# Patient Record
Sex: FEMALE | Race: OTHER | HISPANIC OR LATINO | URBAN - METROPOLITAN AREA
[De-identification: names, ages, dates, MRNs, and addresses within clinical notes are randomized per-mention and may not be internally consistent; named-entity substitution may affect disease eponyms.]

---

## 2020-04-08 ENCOUNTER — EMERGENCY (EMERGENCY)
Facility: HOSPITAL | Age: 55
LOS: 1 days | Discharge: HOME | End: 2020-04-08
Attending: STUDENT IN AN ORGANIZED HEALTH CARE EDUCATION/TRAINING PROGRAM | Admitting: STUDENT IN AN ORGANIZED HEALTH CARE EDUCATION/TRAINING PROGRAM
Payer: COMMERCIAL

## 2020-04-08 VITALS
SYSTOLIC BLOOD PRESSURE: 150 MMHG | DIASTOLIC BLOOD PRESSURE: 100 MMHG | TEMPERATURE: 99 F | RESPIRATION RATE: 14 BRPM | WEIGHT: 160.06 LBS | HEART RATE: 88 BPM | OXYGEN SATURATION: 98 %

## 2020-04-08 LAB
ALBUMIN SERPL ELPH-MCNC: 3.4 G/DL — SIGNIFICANT CHANGE UP (ref 3.3–5)
ALP SERPL-CCNC: 91 U/L — SIGNIFICANT CHANGE UP (ref 40–120)
ALT FLD-CCNC: 55 U/L — SIGNIFICANT CHANGE UP (ref 12–78)
ANION GAP SERPL CALC-SCNC: 6 MMOL/L — SIGNIFICANT CHANGE UP (ref 5–17)
APPEARANCE UR: CLEAR — SIGNIFICANT CHANGE UP
AST SERPL-CCNC: 43 U/L — HIGH (ref 15–37)
BASOPHILS # BLD AUTO: 0.01 K/UL — SIGNIFICANT CHANGE UP (ref 0–0.2)
BASOPHILS NFR BLD AUTO: 0.2 % — SIGNIFICANT CHANGE UP (ref 0–2)
BILIRUB SERPL-MCNC: 0.2 MG/DL — SIGNIFICANT CHANGE UP (ref 0.2–1.2)
BILIRUB UR-MCNC: NEGATIVE — SIGNIFICANT CHANGE UP
BUN SERPL-MCNC: 6 MG/DL — LOW (ref 7–23)
CALCIUM SERPL-MCNC: 9.2 MG/DL — SIGNIFICANT CHANGE UP (ref 8.5–10.1)
CHLORIDE SERPL-SCNC: 107 MMOL/L — SIGNIFICANT CHANGE UP (ref 96–108)
CO2 SERPL-SCNC: 26 MMOL/L — SIGNIFICANT CHANGE UP (ref 22–31)
COLOR SPEC: YELLOW — SIGNIFICANT CHANGE UP
CREAT SERPL-MCNC: 0.65 MG/DL — SIGNIFICANT CHANGE UP (ref 0.5–1.3)
DIFF PNL FLD: ABNORMAL
EOSINOPHIL # BLD AUTO: 0.01 K/UL — SIGNIFICANT CHANGE UP (ref 0–0.5)
EOSINOPHIL NFR BLD AUTO: 0.2 % — SIGNIFICANT CHANGE UP (ref 0–6)
GLUCOSE SERPL-MCNC: 99 MG/DL — SIGNIFICANT CHANGE UP (ref 70–99)
GLUCOSE UR QL: NEGATIVE — SIGNIFICANT CHANGE UP
HCG UR QL: NEGATIVE — SIGNIFICANT CHANGE UP
HCT VFR BLD CALC: 41.8 % — SIGNIFICANT CHANGE UP (ref 34.5–45)
HGB BLD-MCNC: 14.2 G/DL — SIGNIFICANT CHANGE UP (ref 11.5–15.5)
IMM GRANULOCYTES NFR BLD AUTO: 0.2 % — SIGNIFICANT CHANGE UP (ref 0–1.5)
KETONES UR-MCNC: NEGATIVE — SIGNIFICANT CHANGE UP
LEUKOCYTE ESTERASE UR-ACNC: NEGATIVE — SIGNIFICANT CHANGE UP
LYMPHOCYTES # BLD AUTO: 0.8 K/UL — LOW (ref 1–3.3)
LYMPHOCYTES # BLD AUTO: 15.3 % — SIGNIFICANT CHANGE UP (ref 13–44)
MCHC RBC-ENTMCNC: 29.9 PG — SIGNIFICANT CHANGE UP (ref 27–34)
MCHC RBC-ENTMCNC: 34 GM/DL — SIGNIFICANT CHANGE UP (ref 32–36)
MCV RBC AUTO: 88 FL — SIGNIFICANT CHANGE UP (ref 80–100)
MONOCYTES # BLD AUTO: 0.36 K/UL — SIGNIFICANT CHANGE UP (ref 0–0.9)
MONOCYTES NFR BLD AUTO: 6.9 % — SIGNIFICANT CHANGE UP (ref 2–14)
NEUTROPHILS # BLD AUTO: 4.04 K/UL — SIGNIFICANT CHANGE UP (ref 1.8–7.4)
NEUTROPHILS NFR BLD AUTO: 77.2 % — HIGH (ref 43–77)
NITRITE UR-MCNC: NEGATIVE — SIGNIFICANT CHANGE UP
NRBC # BLD: 0 /100 WBCS — SIGNIFICANT CHANGE UP (ref 0–0)
PH UR: 8 — SIGNIFICANT CHANGE UP (ref 5–8)
PLATELET # BLD AUTO: 174 K/UL — SIGNIFICANT CHANGE UP (ref 150–400)
POTASSIUM SERPL-MCNC: 4 MMOL/L — SIGNIFICANT CHANGE UP (ref 3.5–5.3)
POTASSIUM SERPL-SCNC: 4 MMOL/L — SIGNIFICANT CHANGE UP (ref 3.5–5.3)
PROT SERPL-MCNC: 7.8 G/DL — SIGNIFICANT CHANGE UP (ref 6–8.3)
PROT UR-MCNC: NEGATIVE — SIGNIFICANT CHANGE UP
RBC # BLD: 4.75 M/UL — SIGNIFICANT CHANGE UP (ref 3.8–5.2)
RBC # FLD: 12.3 % — SIGNIFICANT CHANGE UP (ref 10.3–14.5)
SODIUM SERPL-SCNC: 139 MMOL/L — SIGNIFICANT CHANGE UP (ref 135–145)
SP GR SPEC: 1.01 — SIGNIFICANT CHANGE UP (ref 1.01–1.02)
UROBILINOGEN FLD QL: NEGATIVE — SIGNIFICANT CHANGE UP
WBC # BLD: 5.23 K/UL — SIGNIFICANT CHANGE UP (ref 3.8–10.5)
WBC # FLD AUTO: 5.23 K/UL — SIGNIFICANT CHANGE UP (ref 3.8–10.5)

## 2020-04-08 PROCEDURE — 81025 URINE PREGNANCY TEST: CPT

## 2020-04-08 PROCEDURE — 85027 COMPLETE CBC AUTOMATED: CPT

## 2020-04-08 PROCEDURE — 96360 HYDRATION IV INFUSION INIT: CPT

## 2020-04-08 PROCEDURE — 81001 URINALYSIS AUTO W/SCOPE: CPT

## 2020-04-08 PROCEDURE — 80053 COMPREHEN METABOLIC PANEL: CPT

## 2020-04-08 PROCEDURE — 36415 COLL VENOUS BLD VENIPUNCTURE: CPT

## 2020-04-08 PROCEDURE — 99283 EMERGENCY DEPT VISIT LOW MDM: CPT

## 2020-04-08 PROCEDURE — 71045 X-RAY EXAM CHEST 1 VIEW: CPT

## 2020-04-08 PROCEDURE — 99283 EMERGENCY DEPT VISIT LOW MDM: CPT | Mod: 25

## 2020-04-08 PROCEDURE — 71045 X-RAY EXAM CHEST 1 VIEW: CPT | Mod: 26

## 2020-04-08 RX ORDER — SODIUM CHLORIDE 9 MG/ML
1000 INJECTION, SOLUTION INTRAVENOUS ONCE
Refills: 0 | Status: COMPLETED | OUTPATIENT
Start: 2020-04-08 | End: 2020-04-08

## 2020-04-08 RX ADMIN — SODIUM CHLORIDE 1000 MILLILITER(S): 9 INJECTION, SOLUTION INTRAVENOUS at 10:20

## 2020-04-08 RX ADMIN — SODIUM CHLORIDE 1000 MILLILITER(S): 9 INJECTION, SOLUTION INTRAVENOUS at 09:20

## 2020-04-08 NOTE — ED PROVIDER NOTE - CARE PLAN
Principal Discharge DX:	Viral infection  Assessment and plan of treatment:	During your ED visit you were evaluated for fever. You had xray and blood work completed and were provided with the results. Your symptoms are likely from covid 19. Follow pre-printed discharge instructions provided containing information on self-quarantine and monitoring.    Rest.  Fluids.  Tylenol 1-2 tablets every 6 hours as needed for fever.  Practice good hand hygiene.  Follow up with your primary care physician.    Please return to ER immediately for trouble breathing, shortness of breath, or any other problems or concerns arise.  Please call ahead if possible.  Return to the ED if you exhibit any new, continued or worsening symptoms.  Secondary Diagnosis:	COVID-19

## 2020-04-08 NOTE — ED PROVIDER NOTE - PATIENT PORTAL LINK FT
You can access the FollowMyHealth Patient Portal offered by [facility] by registering at the following website: [email or web address]. By joining [facility]’s FollowMyHealth portal, you will also be able to view your health information using other applications (apps) compatible with our system.

## 2020-04-08 NOTE — ED PROVIDER NOTE - CLINICAL SUMMARY MEDICAL DECISION MAKING FREE TEXT BOX
55 y/o F with no PMH p/w fever x 5 days.  pt w/ + sick contact well appearing w/ mild symptoms likely COVID. will obtain cxr, cbc, cmp, ua, give IVF x 1L. d/c with isolation precautions

## 2020-04-08 NOTE — ED PROVIDER NOTE - PROGRESS NOTE DETAILS
pt reports feeling better, laboratory results and clinical picture suggestive of COVID 19, pt return precautions explained. will have pt f/u with pcp when afebrile x 3 + days. Provided with covid return precautions

## 2020-04-08 NOTE — ED ADULT NURSE NOTE - BREATHING, MLM
Spontaneous, unlabored and symmetrical Siliq Pregnancy And Lactation Text: The risk during pregnancy and breastfeeding is uncertain with this medication.

## 2020-04-08 NOTE — ED PROVIDER NOTE - OBJECTIVE STATEMENT
53 y/o F with no PMH p/w fever x 5 days. pt reports having fever along with fatigue for 5 days. she states not had SOB, nausea, vomiting, diarrhea, dysuria, cough, abdominal pain. She does not know how high her temperature was at home. She reports + sick contact at home her son who has been having fever and cough and was in ED earlier. she reports fever improved with tylenol. she reports eating well.   Offered , pt deferred

## 2020-04-08 NOTE — ED PROVIDER NOTE - PLAN OF CARE
During your ED visit you were evaluated for fever. You had xray and blood work completed and were provided with the results. Your symptoms are likely from covid 19. Follow pre-printed discharge instructions provided containing information on self-quarantine and monitoring.    Rest.  Fluids.  Tylenol 1-2 tablets every 6 hours as needed for fever.  Practice good hand hygiene.  Follow up with your primary care physician.    Please return to ER immediately for trouble breathing, shortness of breath, or any other problems or concerns arise.  Please call ahead if possible.  Return to the ED if you exhibit any new, continued or worsening symptoms.
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
## 2020-04-09 ENCOUNTER — EMERGENCY (EMERGENCY)
Facility: HOSPITAL | Age: 55
LOS: 1 days | Discharge: HOME | End: 2020-04-09
Attending: EMERGENCY MEDICINE | Admitting: EMERGENCY MEDICINE
Payer: COMMERCIAL

## 2020-04-09 VITALS
RESPIRATION RATE: 18 BRPM | OXYGEN SATURATION: 97 % | WEIGHT: 179.9 LBS | DIASTOLIC BLOOD PRESSURE: 83 MMHG | TEMPERATURE: 99 F | SYSTOLIC BLOOD PRESSURE: 146 MMHG | HEART RATE: 86 BPM

## 2020-04-09 PROCEDURE — 93005 ELECTROCARDIOGRAM TRACING: CPT

## 2020-04-09 PROCEDURE — 99283 EMERGENCY DEPT VISIT LOW MDM: CPT | Mod: 25

## 2020-04-09 PROCEDURE — 99283 EMERGENCY DEPT VISIT LOW MDM: CPT

## 2020-04-09 PROCEDURE — 93010 ELECTROCARDIOGRAM REPORT: CPT

## 2020-04-09 RX ORDER — ACETAMINOPHEN 500 MG
650 TABLET ORAL ONCE
Refills: 0 | Status: COMPLETED | OUTPATIENT
Start: 2020-04-09 | End: 2020-04-09

## 2020-04-09 RX ORDER — ALPRAZOLAM 0.25 MG
0.25 TABLET ORAL ONCE
Refills: 0 | Status: DISCONTINUED | OUTPATIENT
Start: 2020-04-09 | End: 2020-04-09

## 2020-04-09 RX ORDER — ASCORBIC ACID 60 MG
1 TABLET,CHEWABLE ORAL
Qty: 60 | Refills: 0
Start: 2020-04-09 | End: 2020-05-08

## 2020-04-09 RX ORDER — ALPRAZOLAM 0.25 MG
1 TABLET ORAL
Qty: 9 | Refills: 0
Start: 2020-04-09 | End: 2020-04-11

## 2020-04-09 RX ADMIN — Medication 650 MILLIGRAM(S): at 01:10

## 2020-04-09 RX ADMIN — Medication 0.25 MILLIGRAM(S): at 01:10

## 2020-04-09 NOTE — ED PROVIDER NOTE - PATIENT PORTAL LINK FT
You can access the FollowMyHealth Patient Portal offered by [facility] by registering at the following website: [email or web address]. By joining [facility]’s FollowMyHealth portal, you will also be able to view your health information using other applications (apps) compatible with our system.

## 2020-04-09 NOTE — ED PROVIDER NOTE - OBJECTIVE STATEMENT
54 female presents to ER no sig PMH, drove in by daughter, states she was in ER yesterday c/o subjective fevers for 5 days, states son also has same symptoms, had cbc, cmp, chest xray and was normal, went home, today patient feeling anxious, heart racing, denies chest pain.

## 2020-04-09 NOTE — ED ADULT NURSE NOTE - OBJECTIVE STATEMENT
Pt received in bed alert and oriented and resting in bed with the c/o palpitation  and SOB. Pt states that she is positive for COVID 19. As per Md's orders meds given and tolerated well and VS taken and charted. No sign of distress noted and pt SR on cardiac monitor.

## 2020-04-09 NOTE — ED ADULT TRIAGE NOTE - CHIEF COMPLAINT QUOTE
C/O palpitations, states she was seen in the Ed earlier today for the same complaint. Patients heart rate noted to be 86 in triage.

## 2020-04-09 NOTE — ED PROVIDER NOTE - PROGRESS NOTE DETAILS
patient feeling better, agrees to f/u with PMD, understands to return to ER for worsening of symptoms

## 2020-04-09 NOTE — ED PROVIDER NOTE - NSFOLLOWUPINSTRUCTIONS_ED_ALL_ED_FT
LO QUE NECESITA SABER:    La ansiedad es cisco afección que provoca que usted se sienta extremadamente preocupado o nervioso. Los sentimientos son thurman roxi que pueden causar problemas en bambi actividades diarias o el sueño. La ansiedad puede desencadenarse por algo que teme o puede ocurrir sin cisco causa. El estrés familiar o laboral, fumar, la cafeína y el alcohol pueden aumentar nuñez riesgo para sufrir ansiedad. Ciertos medicamentos o condiciones de kelly también pueden aumentar nuñez riesgo. La ansiedad puede convertirse en cisco afección de larga duración si no se controla ni se trata.    INSTRUCCIONES SOBRE EL [facility]PITAL[facility]IA:    Llame al 911 si:    Usted tiene dolor de pecho, presión o pesadez que pueden llegar a propagarse a bambi hombros, brazos, mandíbula, hayley o espalda      Usted siente deseos de lastimarse o lastimar a alguien más.    Comuníquese con nuñez médico si:    Bambi síntomas empeoran o no mejoran con el tratamiento.      Nuñez ansiedad jazmyn que realice bambi actividades diarias.      Tiene nuevos síntomas desde nuñez última consulta.      Usted tiene preguntas o inquietudes acerca de nuñez condición o cuidado.    Medicamentos:    Los medicamentospara ayudarle a sentirse más calmado y relajado y disminuir bambi síntomas.      Antlers bambi medicamentos sukumar se le haya indicado.Consulte con nuñez médico si usted kathryn que nuñez medicamento no le está ayudando o si presenta efectos secundarios. Infórmele si es alérgico a algún medicamento. Mantenga cisco lista actualizada de los medicamentos, las vitaminas y los productos herbales que tg. Incluya los siguientes datos de los medicamentos: cantidad, frecuencia y motivo de administración. Traiga con usted la lista o los envases de las píldoras a bambi citas de seguimiento. Lleve la lista de los medicamentos con usted en linda de cisco emergencia.    Programe cisco kaitlin con nuñez médico dentro de 2 semanas o sukumar se le indique:Anote bambi preguntas para que se acuerde de hacerlas gustavo bambi visitas.    Maneje la ansiedad:    Hable con alguien sobre nuñez ansiedad.Nuñez médico puede sugerirle que reciba consejería. La terapia cognitiva conceptual puede ayudarlo a entender y cambiar la forma que usted reacciona a los eventos que provocan bambi síntomas. Usted podría sentirse más cómodo hablando con un familiar o amigo sobre nuñez ansiedad. Elija a alguien que usted sepa que será comprensivo y alentador.      Aprenda a relajarse.Las actividades sukumar el ejercicio, meditación o escuchar música pueden ayudarlo a relajarse. Pase tiempo con amigos, o rachelle cosas que disfruta.      Practique la respiración profunda.La respiración profunda puede ayudarlo a relajarse cuando se sienta ansioso. Enfóquese en respirar lento y profundo varias veces al día, o gustavo un ataque de ansiedad. Respire por la nariz y exhale por la boca.      Kathryn cisco rutina para dormir.El sueño regular puede ayudarlo a sentirse más tranquilo gustavo el día. Vaya a dormirse y levántese a la misma hora todos los días. No cammie televisión ni use el ordenador vannesa antes de acostarse. Nuñez habitación debe ser confortable, oscura y silenciosa.      Consuma alimentos saludables y variados.Los alimentos saludables incluyen frutas, verduras, productos lácteos bajos en grasa, tony magras, pescado, panes integrales y frijoles cocidos. Los alimentos saludables pueden ayudarlo a sentir menos ansidedad y tener más energía.      Realice actividad física con regularidad.El ejercicio puede ayudarlo a aumentar nuñez nivel de energía. El ejercicio también puede elevar nuñez estado de ánimo y ayudarlo a dormir mejor. Nuñez médico puede ayudarle a crear un plan de ejercicios.      No fume.La nicotina y otros químicos en los cigarrillos y cigarros pueden aumentar la ansiedad. Pida información a nuñez médico si usted actualmente fuma y necesita ayuda para dejar de fumar. Los cigarrillos electrónicos o el tabaco sin humo igualmente contienen nicotina. Consulte con nuñez médico antes de utilizar estos productos.      No consuma cafeína.La cafeína puede empeorar bambi síntomas. No consuma alimentos o bebidas que tengan el propósito de aumentar nuñez nivel de energía.      Limite o no consuma bebidas alcohólicas.Pregúntele a nuñez médico si usted puede cassidy alcohol. Es posible que usted no pueda ingerir alcohol si tg ciertos medicamentos para la ansiedad o la depresión. Limite el consumo de alcohol a 1 trago por día si es willie. Si usted es hombre, limite el consumo de alcohol a 2 tragos por día. Un trago equivale a 12 onzas de cerveza, 5 onzas de vino o 1 onza y ½ de licor.      No use drogas.Las drogas también pueden agravar la ansiedad. También pueden dificultar el manejo de la ansiedad. Hable con nuñez médico si usted consume drogas y necesita ayuda para dejarlas.         © Copyright [employer] 2020       back to top                      © Copyright [employer] 2020

## 2020-04-18 ENCOUNTER — EMERGENCY (EMERGENCY)
Facility: HOSPITAL | Age: 55
LOS: 1 days | Discharge: HOME | End: 2020-04-18
Attending: EMERGENCY MEDICINE | Admitting: EMERGENCY MEDICINE
Payer: COMMERCIAL

## 2020-04-18 VITALS
TEMPERATURE: 100 F | DIASTOLIC BLOOD PRESSURE: 88 MMHG | RESPIRATION RATE: 20 BRPM | OXYGEN SATURATION: 96 % | HEART RATE: 112 BPM | SYSTOLIC BLOOD PRESSURE: 133 MMHG | WEIGHT: 190.04 LBS | HEIGHT: 64 IN

## 2020-04-18 PROCEDURE — 71045 X-RAY EXAM CHEST 1 VIEW: CPT | Mod: 26

## 2020-04-18 PROCEDURE — 99283 EMERGENCY DEPT VISIT LOW MDM: CPT

## 2020-04-18 PROCEDURE — 71045 X-RAY EXAM CHEST 1 VIEW: CPT

## 2020-04-18 PROCEDURE — 99283 EMERGENCY DEPT VISIT LOW MDM: CPT | Mod: 25

## 2020-04-18 RX ORDER — ACETAMINOPHEN 500 MG
2 TABLET ORAL
Qty: 56 | Refills: 0
Start: 2020-04-18 | End: 2020-04-24

## 2020-04-18 RX ORDER — ACETAMINOPHEN 500 MG
650 TABLET ORAL ONCE
Refills: 0 | Status: COMPLETED | OUTPATIENT
Start: 2020-04-18 | End: 2020-04-18

## 2020-04-18 RX ADMIN — Medication 650 MILLIGRAM(S): at 16:23

## 2020-04-18 NOTE — ED PROVIDER NOTE - TEMPLATE, MLM
Problem: Venous Thromboembolism (VTW)/Deep Vein Thrombosis (DVT) Prevention:  Goal: Patient will participate in Venous Thrombosis (VTE)/Deep Vein Thrombosis (DVT)Prevention Measures  Outcome: PROGRESSING AS EXPECTED  Pt is ambulatory.     Problem: Bowel/Gastric:  Goal: Normal bowel function is maintained or improved  Outcome: PROGRESSING AS EXPECTED  Pt having multiple small diarrhea and cramping.         General Cold/Sinus

## 2020-04-18 NOTE — ED PROVIDER NOTE - ATTENDING CONTRIBUTION TO CARE
53 yo  female, COVID-19 ++ here for mild SOB. Seen here multiple times for same. No chest pain. No cough and no fever or chills. Case was discussed in depth with PA and I agree with plan and management which was outlined.

## 2020-04-18 NOTE — ED PROVIDER NOTE - CONSTITUTIONAL, MLM
normal... Well appearing, awake, alert, oriented to person, place, time/situation and in no apparent distress. no tachypnea

## 2020-04-18 NOTE — ED PROVIDER NOTE - PATIENT PORTAL LINK FT
You can access the FollowMyHealth Patient Portal offered by [facility] by registering at the following website: [email or web address]. By joining [facility]’s FollowMyHealth portal, you will also be able to view your health information using other applications (apps) compatible with our system.

## 2020-04-18 NOTE — ED PROVIDER NOTE - NSFOLLOWUPINSTRUCTIONS_ED_ALL_ED_FT
Currently you do not meet criteria for inpatient admission.  You are being sent home at this time for home isolation.  It is currently recommended at this time that you isolate for the next 14 days unless further instructions are provided at a later date.  When home on home isolation please try to use your own bathroom and bedroom   Continue Tylenol per label instructions as needed for fever and body aches  DO NOT TAKE IBUPROFEN AT THIS TIME  Salt water rinse as needed for sore throat  Advance activity as tolerated  Please return to the ED for increased difficulty breathing or signs of respiratory distress  Your will be contacted with your results at a later time

## 2020-04-18 NOTE — ED PROVIDER NOTE - CLINICAL SUMMARY MEDICAL DECISION MAKING FREE TEXT BOX
Pt is a 55 yo female + covid reassured walked around in er o2 sat 99% cxr shows covid changes but not meeting admission criteria advised continue tylenol fluids rest return for worsening sob

## 2020-04-18 NOTE — ED PROVIDER NOTE - OBJECTIVE STATEMENT
Pt is a 53 yo female Honduran speaking no pmhx c/o of feeling desperate and having sob for weeks+ mild cough no chest pain no leg swelling no nvd no abdominal pain no fever pt has been checking. Pt has been 2 ER twice already blood work done out pt covid +. Pt is not a smoker. no recent travels.

## 2020-08-08 ENCOUNTER — RESULT REVIEW (OUTPATIENT)
Age: 55
End: 2020-08-08

## 2020-09-02 ENCOUNTER — RESULT REVIEW (OUTPATIENT)
Age: 55
End: 2020-09-02

## 2020-09-14 PROBLEM — Z00.00 ENCOUNTER FOR PREVENTIVE HEALTH EXAMINATION: Status: ACTIVE | Noted: 2020-09-14

## 2020-09-16 ENCOUNTER — RESULT REVIEW (OUTPATIENT)
Age: 55
End: 2020-09-16

## 2020-09-16 ENCOUNTER — APPOINTMENT (OUTPATIENT)
Dept: ORTHOPEDIC SURGERY | Facility: CLINIC | Age: 55
End: 2020-09-16
Payer: COMMERCIAL

## 2020-09-16 VITALS
WEIGHT: 190 LBS | HEART RATE: 70 BPM | DIASTOLIC BLOOD PRESSURE: 79 MMHG | HEIGHT: 66 IN | BODY MASS INDEX: 30.53 KG/M2 | SYSTOLIC BLOOD PRESSURE: 145 MMHG

## 2020-09-16 PROCEDURE — 99203 OFFICE O/P NEW LOW 30 MIN: CPT

## 2020-10-07 ENCOUNTER — APPOINTMENT (OUTPATIENT)
Dept: ORTHOPEDIC SURGERY | Facility: CLINIC | Age: 55
End: 2020-10-07
Payer: COMMERCIAL

## 2020-10-07 VITALS
SYSTOLIC BLOOD PRESSURE: 130 MMHG | BODY MASS INDEX: 30.53 KG/M2 | WEIGHT: 190 LBS | DIASTOLIC BLOOD PRESSURE: 83 MMHG | HEART RATE: 71 BPM | HEIGHT: 66 IN

## 2020-10-07 DIAGNOSIS — M17.11 UNILATERAL PRIMARY OSTEOARTHRITIS, RIGHT KNEE: ICD-10-CM

## 2020-10-07 PROCEDURE — 20610 DRAIN/INJ JOINT/BURSA W/O US: CPT

## 2020-10-07 PROCEDURE — 99213 OFFICE O/P EST LOW 20 MIN: CPT | Mod: 25

## 2020-10-07 NOTE — HISTORY OF PRESENT ILLNESS
[7] : a current pain level of 7/10 [Walking] : worsened by walking [Acetaminophen] : relieved by acetaminophen [Ice] : relieved by ice [Rest] : relieved by rest [de-identified] : 55 year old female, currently not working in her previous job as a , presents for follow up of Right knee pain/ OA. Reports that her pain has been getting worse. She had 3 sessions of  PT and Celebrex is not helpful. Rates her pain 6/10 with walking and at night. \par \par She denies HTN, DM or smoking. Avoids NSAIDs due to poor GI tolerance.

## 2020-10-07 NOTE — DISCUSSION/SUMMARY
[Medication Risks Reviewed] : Medication risks reviewed [de-identified] : Discussion had with the patient. We will try a cortisone injection today for pain relief. She will stay on the NSAIDs as needed and continue some additional physical therapy once a week. Recommend followup in 2-3 months if needed.

## 2020-10-07 NOTE — PROCEDURE
[de-identified] : The right knee was sterilely cleansed with alcohol and Betadine swabs.  The joint was then injected via a lateral approach with a 3 cc mixture of 1% lidocaine and 40 mg of Depo-Medrol. The procedure was well tolerated. Instructions were given to apply ice to the site if there is post injection site soreness. 8

## 2020-10-07 NOTE — PHYSICAL EXAM
[Slightly Antalgic] : slightly antalgic [LE] : Sensory: Intact in bilateral lower extremities [DP] : dorsalis pedis 2+ and symmetric bilaterally [Knee Swelling Right] : swelling [Knee Tenderness On Palpation Right] : tenderness [Knee Motion Right Crepitus] : crepitus with ROM [Knee Motion Left] : full ROM [Normal RLE] : Right Lower Extremity: No scars, rashes, lesions, ulcers, skin intact [Normal LLE] : Left Lower Extremity: No scars, rashes, lesions, ulcers, skin intact [Knee Motion Right] : limited ROM [Knee Stability / Maneuvers] : negative patellofemoral apprehension test [Knee Anterior Drawer Sign Right] : negative anterior drawer sign [Knee Posterior Drawer Sign Right] : negative posterior drawer sign [Knee Instability Laxity Right Anterior Cruciate Ligament] : negative Lachman's test [Knee Meniscal Integ Roosevelt's Displace Test Right Medial] : negative Roosevelt's medially [Knee Meniscal Integ Roosevelt's Displace Test Right Lateral] : negative Roosevelt's laterally [Knee Medial Instability Right] : no laxity on valgus stress [Knee Lateral Instability Right] : no laxity on varus stress [Patellofemoral Apprehension Test Left] : negative patellofemoral apprehension test [Knee Anterior Drawer Sign Left] : negative anterior drawer sign [Knee Posterior Drawer Sign Left] : negative posterior drawer sign [Knee Instability Laxity Left Anterior Cruciate Ligament] : negative Lachman's test [Knee Meniscal Integ Roosevelt's Displace Test Left Medial] : negative Roosevelt's medially [Knee Meniscal Integ Roosevelt's Displace Test Left Lateral] : negative Roosevelt's laterally [Knee Medial Instability Left] : no laxity on valgus stress [Knee Lateral Instability Left] : no  laxity on varus stress [de-identified] : Right knee: Soft tissue swelling/small effusion. Warmth. No redness. Range of motion 5-110° flexion today with pain and crepitus. No calf tenderness. Left knee: Skin intact. No warmth. No swelling. 0-120° flexion.

## 2020-10-19 RX ORDER — CELECOXIB 200 MG/1
200 CAPSULE ORAL DAILY
Qty: 30 | Refills: 0 | Status: ACTIVE | COMMUNITY
Start: 2020-09-16 | End: 1900-01-01

## 2021-01-07 ENCOUNTER — EMERGENCY (EMERGENCY)
Facility: HOSPITAL | Age: 56
LOS: 1 days | Discharge: HOME | End: 2021-01-07
Attending: STUDENT IN AN ORGANIZED HEALTH CARE EDUCATION/TRAINING PROGRAM | Admitting: STUDENT IN AN ORGANIZED HEALTH CARE EDUCATION/TRAINING PROGRAM
Payer: COMMERCIAL

## 2021-01-07 VITALS
OXYGEN SATURATION: 98 % | HEIGHT: 64 IN | RESPIRATION RATE: 18 BRPM | SYSTOLIC BLOOD PRESSURE: 168 MMHG | HEART RATE: 69 BPM | WEIGHT: 190.04 LBS | TEMPERATURE: 98 F | DIASTOLIC BLOOD PRESSURE: 89 MMHG

## 2021-01-07 LAB
ALBUMIN SERPL ELPH-MCNC: 3.9 G/DL — SIGNIFICANT CHANGE UP (ref 3.3–5)
ALP SERPL-CCNC: 97 U/L — SIGNIFICANT CHANGE UP (ref 40–120)
ALT FLD-CCNC: 33 U/L — SIGNIFICANT CHANGE UP (ref 12–78)
ANION GAP SERPL CALC-SCNC: 4 MMOL/L — LOW (ref 5–17)
APTT BLD: 34.8 SEC — SIGNIFICANT CHANGE UP (ref 27.5–35.5)
AST SERPL-CCNC: 21 U/L — SIGNIFICANT CHANGE UP (ref 15–37)
BASOPHILS # BLD AUTO: 0.04 K/UL — SIGNIFICANT CHANGE UP (ref 0–0.2)
BASOPHILS NFR BLD AUTO: 0.8 % — SIGNIFICANT CHANGE UP (ref 0–2)
BILIRUB SERPL-MCNC: 0.6 MG/DL — SIGNIFICANT CHANGE UP (ref 0.2–1.2)
BUN SERPL-MCNC: 9 MG/DL — SIGNIFICANT CHANGE UP (ref 7–23)
CALCIUM SERPL-MCNC: 9.2 MG/DL — SIGNIFICANT CHANGE UP (ref 8.5–10.1)
CHLORIDE SERPL-SCNC: 106 MMOL/L — SIGNIFICANT CHANGE UP (ref 96–108)
CO2 SERPL-SCNC: 30 MMOL/L — SIGNIFICANT CHANGE UP (ref 22–31)
CREAT SERPL-MCNC: 0.68 MG/DL — SIGNIFICANT CHANGE UP (ref 0.5–1.3)
EOSINOPHIL # BLD AUTO: 0.06 K/UL — SIGNIFICANT CHANGE UP (ref 0–0.5)
EOSINOPHIL NFR BLD AUTO: 1.2 % — SIGNIFICANT CHANGE UP (ref 0–6)
GLUCOSE SERPL-MCNC: 99 MG/DL — SIGNIFICANT CHANGE UP (ref 70–99)
HCT VFR BLD CALC: 44.3 % — SIGNIFICANT CHANGE UP (ref 34.5–45)
HGB BLD-MCNC: 15 G/DL — SIGNIFICANT CHANGE UP (ref 11.5–15.5)
IMM GRANULOCYTES NFR BLD AUTO: 0.2 % — SIGNIFICANT CHANGE UP (ref 0–1.5)
INR BLD: 1.05 RATIO — SIGNIFICANT CHANGE UP (ref 0.88–1.16)
LYMPHOCYTES # BLD AUTO: 1.25 K/UL — SIGNIFICANT CHANGE UP (ref 1–3.3)
LYMPHOCYTES # BLD AUTO: 24.3 % — SIGNIFICANT CHANGE UP (ref 13–44)
MCHC RBC-ENTMCNC: 30.3 PG — SIGNIFICANT CHANGE UP (ref 27–34)
MCHC RBC-ENTMCNC: 33.9 GM/DL — SIGNIFICANT CHANGE UP (ref 32–36)
MCV RBC AUTO: 89.5 FL — SIGNIFICANT CHANGE UP (ref 80–100)
MONOCYTES # BLD AUTO: 0.29 K/UL — SIGNIFICANT CHANGE UP (ref 0–0.9)
MONOCYTES NFR BLD AUTO: 5.6 % — SIGNIFICANT CHANGE UP (ref 2–14)
NEUTROPHILS # BLD AUTO: 3.49 K/UL — SIGNIFICANT CHANGE UP (ref 1.8–7.4)
NEUTROPHILS NFR BLD AUTO: 67.9 % — SIGNIFICANT CHANGE UP (ref 43–77)
NRBC # BLD: 0 /100 WBCS — SIGNIFICANT CHANGE UP (ref 0–0)
PLATELET # BLD AUTO: 212 K/UL — SIGNIFICANT CHANGE UP (ref 150–400)
POTASSIUM SERPL-MCNC: 4.1 MMOL/L — SIGNIFICANT CHANGE UP (ref 3.5–5.3)
POTASSIUM SERPL-SCNC: 4.1 MMOL/L — SIGNIFICANT CHANGE UP (ref 3.5–5.3)
PROT SERPL-MCNC: 8 G/DL — SIGNIFICANT CHANGE UP (ref 6–8.3)
PROTHROM AB SERPL-ACNC: 12.3 SEC — SIGNIFICANT CHANGE UP (ref 10.6–13.6)
RBC # BLD: 4.95 M/UL — SIGNIFICANT CHANGE UP (ref 3.8–5.2)
RBC # FLD: 12.5 % — SIGNIFICANT CHANGE UP (ref 10.3–14.5)
SODIUM SERPL-SCNC: 140 MMOL/L — SIGNIFICANT CHANGE UP (ref 135–145)
TROPONIN I SERPL-MCNC: <.015 NG/ML — SIGNIFICANT CHANGE UP (ref 0.01–0.04)
TSH SERPL-MCNC: 0.54 UIU/ML — SIGNIFICANT CHANGE UP (ref 0.36–3.74)
WBC # BLD: 5.14 K/UL — SIGNIFICANT CHANGE UP (ref 3.8–10.5)
WBC # FLD AUTO: 5.14 K/UL — SIGNIFICANT CHANGE UP (ref 3.8–10.5)

## 2021-01-07 PROCEDURE — 85610 PROTHROMBIN TIME: CPT

## 2021-01-07 PROCEDURE — 93010 ELECTROCARDIOGRAM REPORT: CPT

## 2021-01-07 PROCEDURE — 99284 EMERGENCY DEPT VISIT MOD MDM: CPT

## 2021-01-07 PROCEDURE — 80053 COMPREHEN METABOLIC PANEL: CPT

## 2021-01-07 PROCEDURE — 84484 ASSAY OF TROPONIN QUANT: CPT

## 2021-01-07 PROCEDURE — 93005 ELECTROCARDIOGRAM TRACING: CPT

## 2021-01-07 PROCEDURE — 70450 CT HEAD/BRAIN W/O DYE: CPT

## 2021-01-07 PROCEDURE — 71045 X-RAY EXAM CHEST 1 VIEW: CPT | Mod: 26

## 2021-01-07 PROCEDURE — 85025 COMPLETE CBC W/AUTO DIFF WBC: CPT

## 2021-01-07 PROCEDURE — 84443 ASSAY THYROID STIM HORMONE: CPT

## 2021-01-07 PROCEDURE — 36415 COLL VENOUS BLD VENIPUNCTURE: CPT

## 2021-01-07 PROCEDURE — 85730 THROMBOPLASTIN TIME PARTIAL: CPT

## 2021-01-07 PROCEDURE — 99284 EMERGENCY DEPT VISIT MOD MDM: CPT | Mod: 25

## 2021-01-07 PROCEDURE — 70450 CT HEAD/BRAIN W/O DYE: CPT | Mod: 26

## 2021-01-07 PROCEDURE — 71045 X-RAY EXAM CHEST 1 VIEW: CPT

## 2021-01-07 RX ORDER — SODIUM CHLORIDE 9 MG/ML
1000 INJECTION INTRAMUSCULAR; INTRAVENOUS; SUBCUTANEOUS ONCE
Refills: 0 | Status: DISCONTINUED | OUTPATIENT
Start: 2021-01-07 | End: 2021-01-10

## 2021-01-07 NOTE — ED PROVIDER NOTE - CLINICAL SUMMARY MEDICAL DECISION MAKING FREE TEXT BOX
56 y/o F with hx of anxiety c/o intermittent headache/weakness/chills/palpitations/anxiety x 1 month, worse x 5 days, seen by pcp 2 days ago and had workup (does not have results) and referral for neuro, has f/u with pcp on 01/20, stopped taking xanax 2 months ago; no cp/sob/abd pain/v/d/fever/rash; VSS; appears anxious, likely anxiety related, no neuro deficits, lungs cta B, will get ekg, tsh, labs, cxr, IVF, pt requesting ct head, will reassess 56 y/o F with hx of anxiety c/o intermittent headache/weakness/chills/palpitations/anxiety x 1 month, worse x 5 days, seen by pcp 2 days ago and had workup (does not have results) and referral for neuro, has f/u with pcp on 01/20, stopped taking xanax 2 months ago; no cp/sob/abd pain/v/d/fever/rash; VSS; appears anxious, no neuro deficits, lungs cta B, will get ekg, tsh, labs, cxr, IVF, pt requesting ct head, will reassess

## 2021-01-07 NOTE — ED PROVIDER NOTE - ATTENDING CONTRIBUTION TO CARE
54yo F presents with months of dizziness, weakness, palpitations, numbness in hands and head sinceh aving covid.  pt well appearing, ekg nsr, normal exam, will check labs, trop, tsh, and pmd and cards follow up if no acute findings

## 2021-01-07 NOTE — ED PROVIDER NOTE - CARE PROVIDER_API CALL
DARBY TYLER  Family Practice  [address] [place] [address]  Phone: [phone]  Fax: [phone]  Follow Up Time: 1-3 Days    Nathan Spivey)  Cardiovascular Disease  [address] [place] [address]  Phone: [phone]  Fax: [phone]  Follow Up Time: 1-3 Days

## 2021-01-07 NOTE — ED PROVIDER NOTE - PATIENT PORTAL LINK FT
You can access the FollowMyHealth Patient Portal offered by [facility] by registering at the following website: [email or web address]. By joining [facility]’s FollowMyHealth portal, you will also be able to view your health information using other applications (apps) compatible with our system.

## 2021-01-07 NOTE — ED PROVIDER NOTE - PROVIDER TOKENS
PROVIDER:[TOKEN:[[id number]],FOLLOWUP:[1-3 Days]],PROVIDER:[TOKEN:[[id number]],FOLLOWUP:[1-3 Days]]

## 2021-01-07 NOTE — ED PROVIDER NOTE - NSFOLLOWUPINSTRUCTIONS_ED_ALL_ED_FT
Follow up with your PMD in 1-2 days for re-evaluation, ongoing care and treatment. Follow up with neurologist as advised earlier. Follow up with cardiologist. Rest, drink plenty of fluids. If having worsening of symptoms or other related symptoms, RETURN TO THE ER IMMEDIATELY.

## 2021-01-07 NOTE — ED PROVIDER NOTE - CONSTITUTIONAL, MLM
appears slightly anxious, awake, alert, oriented to person, place, time/situation and in no apparent distress. normal...

## 2021-01-07 NOTE — ED PROVIDER NOTE - OBJECTIVE STATEMENT
Hx via Lithuanian translation by Jessie from ED registration. 56 y/o F with hx of anxiety presents with c/o palpitations, chills, generalized weakness, headache and feeling anxious x 5 days. Pt states that she had covid in March last year and since has had anxiety, was on xanax prescribed by PMD after which she stopped taking 2 months ago since she did not want to get "hooked" on it. States that she had the same symptoms one month ago and then it stopped and it started again on Sunday. States that she felt better after Monday and then symptoms started again yesterday. States that she was seen by PMD 2 days ago and had labs done, was given referral to see a neurologist and had appt with pcp, Dr. Jerica George on 01/20 for follow up. States that she has frequent pressure and "cold" sensation to posterior aspect of her head and is requesting imaging. Denies fever, cough, CP, SOB, abdominal pain, N/V/D, body aches, recent travel, known sick contacts, calf pain/swelling, dizziness, vision changes, numbness, tingling, focal weakness or other symptoms. Hx via Lao translation by Jessie from ED registration. 54 y/o F with hx of ?anxiety presents with c/o palpitations, chills, generalized weakness, headache and feeling anxious x 5 days. Pt states that she had covid in March last year and since has had anxiety, was on xanax prescribed by PMD after which she stopped taking 2 months ago since she did not want to get "hooked" on it. States that she had the same symptoms one month ago and then it stopped and it started again on Sunday. States that she felt better after Monday and then symptoms started again yesterday. States that she was seen by PMD 2 days ago and had labs done, was given referral to see a neurologist and had appt with pcp, Dr. Jerica George on 01/20 for follow up. States that she has frequent pressure and "cold" sensation to posterior aspect of her head and is requesting imaging. Denies fever, cough, CP, SOB, abdominal pain, N/V/D, body aches, recent travel, known sick contacts, calf pain/swelling, dizziness, vision changes, numbness, tingling, focal weakness or other symptoms.

## 2021-01-12 PROBLEM — F41.9 ANXIETY DISORDER, UNSPECIFIED: Chronic | Status: ACTIVE | Noted: 2021-01-07

## 2021-01-13 DIAGNOSIS — Z82.49 FAMILY HISTORY OF ISCHEMIC HEART DISEASE AND OTHER DISEASES OF THE CIRCULATORY SYSTEM: ICD-10-CM

## 2021-01-13 DIAGNOSIS — Z83.3 FAMILY HISTORY OF DIABETES MELLITUS: ICD-10-CM

## 2021-01-13 DIAGNOSIS — Z86.79 PERSONAL HISTORY OF OTHER DISEASES OF THE CIRCULATORY SYSTEM: ICD-10-CM

## 2021-01-13 DIAGNOSIS — Z78.9 OTHER SPECIFIED HEALTH STATUS: ICD-10-CM

## 2021-01-13 RX ORDER — ALPRAZOLAM 0.25 MG/1
0.25 TABLET ORAL
Refills: 0 | Status: ACTIVE | COMMUNITY

## 2021-01-15 ENCOUNTER — APPOINTMENT (OUTPATIENT)
Dept: CARDIOLOGY | Facility: CLINIC | Age: 56
End: 2021-01-15
Payer: COMMERCIAL

## 2021-01-15 ENCOUNTER — NON-APPOINTMENT (OUTPATIENT)
Age: 56
End: 2021-01-15

## 2021-01-15 VITALS
SYSTOLIC BLOOD PRESSURE: 130 MMHG | OXYGEN SATURATION: 99 % | WEIGHT: 194 LBS | BODY MASS INDEX: 31.18 KG/M2 | DIASTOLIC BLOOD PRESSURE: 82 MMHG | HEART RATE: 64 BPM | HEIGHT: 66 IN

## 2021-01-15 DIAGNOSIS — R00.2 PALPITATIONS: ICD-10-CM

## 2021-01-15 PROCEDURE — 93000 ELECTROCARDIOGRAM COMPLETE: CPT

## 2021-01-15 PROCEDURE — 99204 OFFICE O/P NEW MOD 45 MIN: CPT

## 2021-01-15 PROCEDURE — 99072 ADDL SUPL MATRL&STAF TM PHE: CPT

## 2021-01-21 ENCOUNTER — RESULT CHARGE (OUTPATIENT)
Age: 56
End: 2021-01-21

## 2021-01-22 ENCOUNTER — NON-APPOINTMENT (OUTPATIENT)
Age: 56
End: 2021-01-22

## 2021-01-22 PROBLEM — R00.2 PALPITATIONS: Status: ACTIVE | Noted: 2021-01-22

## 2021-01-22 NOTE — PHYSICAL EXAM
[General Appearance - Well Developed] : well developed [Normal Appearance] : normal appearance [Well Groomed] : well groomed [General Appearance - Well Nourished] : well nourished [No Deformities] : no deformities [General Appearance - In No Acute Distress] : no acute distress [Normal Conjunctiva] : the conjunctiva exhibited no abnormalities [Eyelids - No Xanthelasma] : the eyelids demonstrated no xanthelasmas [Normal Oral Mucosa] : normal oral mucosa [No Oral Pallor] : no oral pallor [No Oral Cyanosis] : no oral cyanosis [Normal Jugular Venous A Waves Present] : normal jugular venous A waves present [Normal Jugular Venous V Waves Present] : normal jugular venous V waves present [No Jugular Venous Gomez A Waves] : no jugular venous gomez A waves [5th Left ICS - MCL] : palpated at the 5th LICS in the midclavicular line [Normal] : normal [No Precordial Heave] : no precordial heave was noted [Normal Rate] : normal [Apical Thrill] : no thrill palpable at the apex [Heart Rate ___] : [unfilled] bpm [Rhythm Regular] : regular [Normal S1] : normal S1 [Normal S2] : normal S2 [No Gallop] : no gallop heard [S3] : no S3 [Click] : no click [S4] : no S4 [Pericardial Rub] : no pericardial rub [No Murmur] : no murmurs heard [Left Carotid Bruit] : no bruit heard over the left carotid [Right Carotid Bruit] : no bruit heard over the right carotid [Right Femoral Bruit] : no bruit heard over the right femoral artery [Left Femoral Bruit] : no bruit heard over the left femoral artery [2+] : left 2+ [No Abnormalities] : the abdominal aorta was not enlarged and no bruit was heard [Bruit] : no bruit heard [No Pitting Edema] : no pitting edema present [Rt] : no varicose veins of the right leg [Lt] : no varicose veins of the left leg [Respiration, Rhythm And Depth] : normal respiratory rhythm and effort [Exaggerated Use Of Accessory Muscles For Inspiration] : no accessory muscle use [Auscultation Breath Sounds / Voice Sounds] : lungs were clear to auscultation bilaterally [Abdomen Soft] : soft [Abdomen Tenderness] : non-tender [Abdomen Mass (___ Cm)] : no abdominal mass palpated [Abnormal Walk] : normal gait [Gait - Sufficient For Exercise Testing] : the gait was sufficient for exercise testing [Nail Clubbing] : no clubbing of the fingernails [Cyanosis, Localized] : no localized cyanosis [Petechial Hemorrhages (___cm)] : no petechial hemorrhages [Skin Color & Pigmentation] : normal skin color and pigmentation [] : no rash [No Venous Stasis] : no venous stasis [Skin Lesions] : no skin lesions [No Xanthoma] : no  xanthoma was observed [No Skin Ulcers] : no skin ulcer [Oriented To Time, Place, And Person] : oriented to person, place, and time [Affect] : the affect was normal [Mood] : the mood was normal [No Anxiety] : not feeling anxious

## 2021-01-22 NOTE — HISTORY OF PRESENT ILLNESS
[FreeTextEntry1] : She presents after recent hospitalization.  She was seen in the emergency department with dizziness and weakness found to have somewhat elevated blood pressure.  She also had palpitations but her work-up was unremarkable.  She is feeling better now and reports having Covid infection in March of this year manifested by fever, weakness, and anxiety.\par \par Social history is negative for tobacco use, social alcohol\par \par Family history is remarkable for a daughter with a nonischemic cardiomyopathy, history of heart failure, and ICD placement

## 2021-01-22 NOTE — DISCUSSION/SUMMARY
[FreeTextEntry1] : Patient appears well with no evidence of vascular disease or structural heart disease on examination.  She may have had a benign supraventricular tachycardia and we discussed the cardiac implications of Covid.  She will undergo echocardiography and stress testing to rule out any evidence of occult structural heart disease.  We discussed cardiovascular risk factor reduction and counseling represented greater than 50% of her visit which was 45 minutes in duration

## 2021-02-04 ENCOUNTER — APPOINTMENT (OUTPATIENT)
Dept: CARDIOLOGY | Facility: CLINIC | Age: 56
End: 2021-02-04

## 2021-02-26 ENCOUNTER — APPOINTMENT (OUTPATIENT)
Dept: CARDIOLOGY | Facility: CLINIC | Age: 56
End: 2021-02-26

## 2021-06-15 NOTE — ED PROVIDER NOTE - SKIN, MLM
· Patient extubated on 06/14/2021  · Further management as above Skin normal color for race, warm, dry and intact. No evidence of rash.

## 2021-11-22 NOTE — ED ADULT NURSE NOTE - NS ED NURSE DC INFO COMPLEXITY
Placed in a hospital bed. Clothes changed into large gown. SR up x2. PO decreases with activity/movement. States she is much more comfortable now. Call light in reach. Offers no complaints and denies other needs.        Parris Fuentes RN  11/22/21 3445 Verbalized Understanding/Simple: Patient demonstrates quick and easy understanding

## 2022-09-07 ENCOUNTER — EMERGENCY (EMERGENCY)
Facility: HOSPITAL | Age: 57
LOS: 1 days | Discharge: HOME | End: 2022-09-07
Attending: EMERGENCY MEDICINE | Admitting: EMERGENCY MEDICINE
Payer: COMMERCIAL

## 2022-09-07 VITALS
OXYGEN SATURATION: 97 % | DIASTOLIC BLOOD PRESSURE: 85 MMHG | SYSTOLIC BLOOD PRESSURE: 128 MMHG | HEART RATE: 80 BPM | RESPIRATION RATE: 16 BRPM | TEMPERATURE: 98 F

## 2022-09-07 VITALS
WEIGHT: 190.04 LBS | OXYGEN SATURATION: 97 % | DIASTOLIC BLOOD PRESSURE: 68 MMHG | HEART RATE: 77 BPM | SYSTOLIC BLOOD PRESSURE: 126 MMHG | HEIGHT: 64 IN | TEMPERATURE: 99 F

## 2022-09-07 PROCEDURE — 73030 X-RAY EXAM OF SHOULDER: CPT

## 2022-09-07 PROCEDURE — 73030 X-RAY EXAM OF SHOULDER: CPT | Mod: 26,RT

## 2022-09-07 PROCEDURE — 99283 EMERGENCY DEPT VISIT LOW MDM: CPT | Mod: 25

## 2022-09-07 PROCEDURE — 99284 EMERGENCY DEPT VISIT MOD MDM: CPT

## 2022-09-07 RX ORDER — QUETIAPINE FUMARATE 200 MG/1
0 TABLET, FILM COATED ORAL
Qty: 0 | Refills: 0 | DISCHARGE

## 2022-09-07 RX ORDER — OXYCODONE AND ACETAMINOPHEN 5; 325 MG/1; MG/1
1 TABLET ORAL ONCE
Refills: 0 | Status: DISCONTINUED | OUTPATIENT
Start: 2022-09-07 | End: 2022-09-07

## 2022-09-07 RX ORDER — ALPRAZOLAM 0.25 MG
0 TABLET ORAL
Qty: 0 | Refills: 0 | DISCHARGE

## 2022-09-07 RX ADMIN — OXYCODONE AND ACETAMINOPHEN 1 TABLET(S): 5; 325 TABLET ORAL at 16:10

## 2022-09-07 RX ADMIN — OXYCODONE AND ACETAMINOPHEN 1 TABLET(S): 5; 325 TABLET ORAL at 15:40

## 2022-09-07 NOTE — ED PROVIDER NOTE - CLINICAL SUMMARY MEDICAL DECISION MAKING FREE TEXT BOX
57-year-old  female states that approximately 1 month ago right shoulder was injured at the gym where she was working out in.  Since that time she has had pain in the right shoulder worsened with movement better with immobilization.  This case will require complete evaluation as well as imaging of the right shoulder.

## 2022-09-07 NOTE — ED PROVIDER NOTE - NSFOLLOWUPINSTRUCTIONS_ED_ALL_ED_FT
1) Follow-up with Orthopedics, See referred doctor. Call today/next business day for close, prompt follow-up.  2) Return to Emergency room for any worsening or persistent pain, weakness, numbness, fever, color change to extremity, or any other concerning symptoms.  3) Take ibuprofen 600 mg every  6 hours as needed. Be cautious when taking Percocet as it may cause drowsiness. Do not drive or operate machinery when taking percocet. Percocet is a narcotic. Narcotics have addictive properties, be cautious when taking this medication. Avoid taking if history of alcohol/drug abuse.   4) You can consider discussing with your doctor if physical therapy or further imaging as an MRI may be beneficial.   5) keep sling on.     Luxación de hombro    Shoulder Dislocation       La articulación del hombro está compuesta por 3 huesos:  •El hueso de la parte superior del brazo (húmero).      •El omóplato (escápula).      •La clavícula.      La luxación de hombro se produce cuando el hueso de la parte superior del brazo se desplaza de nuñez ubicación normal en la articulación del hombro.      ¿Cuáles son las causas?    Las causas de esta afección suelen ser las siguientes:  •Cisco caída.      •Un golpe vanesa y directo en el hombro.      •Un movimiento brusco del hombro.        ¿Qué incrementa el riesgo?    Es más probable que tenga esta afección si practica deportes.      ¿Cuáles son los signos o los síntomas?     •Forma defectuosa (deformidad) del hombro.      •Dolor muy intenso.      •Imposibilidad de  un hombre.      •Adormecimiento, debilidad u hormigueo en el hayley o el brazo.      •Moretón o hinchazón alrededor del hombro.        ¿Cómo se trata?    Esta afección se trata con un procedimiento llamado reducción. [place] se realiza para volver a colocar el hueso de la parte superior del brazo en la articulación. Existen dos tipos de reducción:  •Reducción cerrada. El hueso de la parte superior del brazo se vuelve a colocar en la articulación sin cirugía. El médico acomoda el hueso en nuñez lugar con las xu.    •Reducción abierta. Se realiza cisco cirugía para volver a colocar el hueso de la parte superior del brazo en la articulación. Esta puede ser necesaria en los siguientes casos:  •Usted tiene debilidad en la articulación del hombro o en los tejidos que conectan los huesos entre ellos (ligamentos).      •Ha tenido más de cisco luxación de hombro.      •Presenta daño en los nervios o los vasos sanguíneos que rodean al hombro.        Después del procedimiento, utilizará un dispositivo ortopédico o cabestrillo para evitar que el brazo se mueva.    Cuando le saquen el dispositivo ortopédico o cabestrillo, recibirá fisioterapia para ayudar a mejorar el movimiento (amplitud de movimiento) de la articulación del hombro.      Siga estas indicaciones en nuñez casa:    Medicamentos     •Voltaire los medicamentos de venta troy y los recetados solamente sukumar se lo haya indicado el médico.    •Consulte a nuñez médico si el medicamento que le recetó:   •Hace que sea necesario no conducir ni usar maquinaria pesada.     •Puede provocarle dificultad para defecar (estreñimiento). Es posible que deba cassidy medidas para prevenir o tratar los problemas para defecar:  •Birgit suficiente líquido para mantener el pis (la orina) de color amarillo pálido.      •Voltaire medicamentos recetados o de venta troy.      •Coma alimentos ricos en fibra. Entre ellos, frijoles, cereales integrales y frutas y verduras frescas.       •Limite los alimentos con alto contenido de grasa y azúcar. Estos incluyen alimentos fritos o dulces.          Si tiene un dispositivo ortopédico o cabestrillo:     •Use el dispositivo ortopédico o cabestrillo sukumar se lo haya indicado el médico. Quíteselo solamente sukumar se lo haya indicado el médico.    •Afloje el dispositivo ortopédico o el cabestrillo si los dedos:  •Hormiguean.      •Se adormecen.      •Se tornan fríos o de color saima.        •Mantenga limpio el dispositivo ortopédico o el cabestrillo.    •Si el dispositivo ortopédico o el cabestrillo no es impermeable:  •No deje que se moje.      •Cúbralo con un envoltorio hermético cuando tome un baño de inmersión o cisco ducha.          Control del dolor, la rigidez y la hinchazón    •Aplique hielo sobre la sridevi lesionada, si se lo indican.  •Si tiene un dispositivo ortopédico o cabestrillo desmontable, quíteselo sukumar se lo haya indicado el médico.      •Ponga el hielo en cisco bolsa plástica.      •Coloque cisco toalla entre la piel y la bolsa.      •Coloque el hielo gustavo 20 minutos, 2 a 3 veces al día.        •Mueva los dedos con frecuencia.      •Cuando esté sentado o acostado, levante (eleve) la sridevi lesionada por encima del nivel del corazón.      Actividad     • No levante el brazo por encima del hombro hasta que el médico lo autorice.      • No levante nada hasta que el médico le diga que puede hacerlo.      • No empuje ni tire de objetos hasta que el médico lo apruebe.      •Reanude pietro actividades normales según lo indicado por el médico. Pregúntele al médico qué actividades son seguras para usted.      •Priscilla ejercicios de flexibilidad solamente sukumar se lo haya indicado el médico.      •Ejercite la mano apretando cisco pelota blanda. [place] evitará que la mano y la kat se pongan rígidas e hinchen.      Indicaciones generales     • No conduzca mientras esté usando un dispositivo ortopédico o un cabestrillo en la mano que utiliza para conducir.      • No se dé bambi de inmersión, no nade ni use el jacuzzi hasta que el médico lo apruebe. Pregunte al médico si puede ducharse. Martín vez solo le permitan darse bambi de esponja.      • No consuma ningún producto que contenga tabaco, lo que incluye cigarrillos, tabaco de mascar o cigarrillos electrónicos. Estos pueden retrasar la recuperación. Si necesita ayuda para dejar de fumar, consulte al médico.      •Concurra a todas las visitas de seguimiento sukumar se lo haya indicado el médico. [place] es importante.        Comuníquese con un médico si:    •El dispositivo ortopédico o el cabestrillo se daña.        Solicite ayuda inmediatamente si:    •El dolor empeora en lugar de mejorar.      •Pierde la sensibilidad en el brazo o la mano.      •El brazo o la mano se ponen blancos y fríos.        Resumen    •La luxación de hombro se produce cuando el hueso de la parte superior del brazo se desplaza de nuñez ubicación normal en la articulación del hombro.      •Generalmente es causada por cisco caída, un golpe vanesa en el hombro o un movimiento brusco del hombro.      •Causa un dolor muy intenso. Es posible que no pueda  el hombro.      •Esta afección se trata con reducción abierta o reducción cerrada. También le darán un dispositivo ortopédico o un cabestrillo. Deberá hacer ejercicios para mejorar la movilidad de la articulación del hombro.      •Comuníquese con un médico si el dispositivo ortopédico o el cabestrillo se daña. Solicite ayuda de inmediato si el dolor empeora, pierde la sensibilidad en el brazo o la mano, o el brazo o la mano se le ponen blancos o fríos.      Esta información no tiene sukumar fin reemplazar el consejo del médico. Asegúrese de hacerle al médico cualquier pregunta que tenga.

## 2022-09-07 NOTE — ED PROVIDER NOTE - TOBACCO USE
Never smoker FAMILY HISTORY:  Father  Still living? Unknown  FH: diabetes mellitus, Age at diagnosis: Age Unknown    Mother  Still living? No  Family history of osteoarthritis, Age at diagnosis: Age Unknown

## 2022-09-07 NOTE — ED PROVIDER NOTE - CARE PROVIDER_API CALL
Walker Alejo)  Chepe Weldon  Physicians  [address] [place] [address]  Phone: [phone]  Fax: [phone]  Follow Up Time: 1-3 Days

## 2022-09-07 NOTE — ED PROVIDER NOTE - PHYSICAL EXAMINATION
Constitutional: Awake, Alert, non-toxic. NAD. Well appearing, well nourished.   HEAD: Normocephalic, atraumatic.   EYES: EOM intact, conjunctiva and sclera are clear bilaterally.   ENT: No rhinorrhea, patent, mucous membranes pink/moist, no drooling or stridor.   NECK: Supple, non-tender  CARDIOVASCULAR: Normal S1, S2; regular rate and rhythm.  RESPIRATORY: Normal respiratory effort; breath sounds CTAB, no wheezes, rhonchi, or rales. Speaking in full sentences. No accessory muscle use.   ABDOMEN: Soft; non-tender, non-distended.   EXTREMITIES: Full passive and active ROM in all extremities; (+) right shoulder TTP and limited active/passive ROM, clavicle and elbow non-tender to palpation; distal pulses palpable and symmetric  SKIN: Warm, dry; good skin turgor, no apparent lesions or rashes, no ecchymosis, brisk capillary refill.  NEURO: A&O x3. Sensory and motor functions are grossly intact. Speech is normal. Appearance and judgement seem appropriate for gender and age.

## 2022-09-07 NOTE — ED PROVIDER NOTE - PATIENT PORTAL LINK FT
You can access the FollowMyHealth Patient Portal offered by [facility] by registering at the following website: [email or web address]. By joining [facility]’s FollowMyHealth portal, you will also be able to view your health information using other applications (apps) compatible with our system.

## 2022-09-07 NOTE — ED PROVIDER NOTE - PROGRESS NOTE DETAILS
sling provided. injury x 1 month. All questions were answered. Discussed the importance of prompt, close medical follow-up. Patient will return with any changes, concerns or persistent/worsening symptoms.  Patient verbalized understanding.

## 2022-09-07 NOTE — ED PROVIDER NOTE - ATTENDING APP SHARED VISIT CONTRIBUTION OF CARE
Patient is a  female in no acute distress with moderate tenderness to palpation of the right shoulder very proximal humeral area with pain mostly upon abduction and flexion and external rotation of the right upper extremity.  Neurovascular is entirely intact in the right upper extremity as well.  I agree with plan and management outlined by PA.

## 2022-09-07 NOTE — ED PROVIDER NOTE - OBJECTIVE STATEMENT
58 y/o female without reported PMHX presents today due to right shoulder pain. Reports she fell off the machine in the gym 1 month ago and has been having pain to shoulder. Aching, non-radiating, and currently 10/10. Pt has been taking NSAIDs without relief. pt denies head injury, LOC, vomiting, numbness/weakness, cp, sob, or any other complaints.

## 2022-09-07 NOTE — ED PROVIDER NOTE - NS ED ATTENDING STATEMENT MOD
This was a shared visit with the NEYMAR. I reviewed and verified the documentation and independently performed the documented:

## 2023-01-08 NOTE — ED ADULT NURSE NOTE - OBJECTIVE STATEMENT
No Presents to ER with fever since Tuesday. Family at home is sick as well. Respirations nonlabored and eupneic, no s/s respiratory distress.

## 2023-05-03 NOTE — ED ADULT NURSE NOTE - NS ED NURSE LEVEL OF CONSCIOUSNESS SPEECH
How Many Skin Cancers Have You Had?: one What Is The Reason For Today's Visit?: History of Non-Melanoma Skin Cancer Speaking Coherently

## 2024-07-09 NOTE — ED ADULT NURSE NOTE - CAS DISCH TRANSFER METHOD
Caller: Briana Alas    Relationship: Self    Best call back number: [phone]    Requested Prescriptions:   Requested Prescriptions     Pending Prescriptions Disp Refills    carisoprodol (SOMA) 350 MG tablet 90 tablet 0     Sig: Take 1 tablet by mouth 3 (Three) Times a Day As Needed for Muscle Spasms.        Pharmacy where request should be sent: [facility] [id number] - [place], [place] - [address] AT [place] [address] - [phone] [facility] [phone]      Last office visit with prescribing clinician: 6/24/2024   Last telemedicine visit with prescribing clinician: Visit date not found   Next office visit with prescribing clinician: 11/4/2024       If the office needs to give you a call back, can they leave a voicemail: [] Yes [] No    Digna Rainey Rep   07/09/24 14:33 EDT         
Private car
No complaints

## 2025-06-19 NOTE — ED PROVIDER NOTE - SEVERITY
[Declined] : declined [FreeTextEntry3] : Focused skin exam performed  The relevant portions of the exam were performed today  AAOx3, NAD, well-appearing / pleasant Focused examination within normal limits with the exception of:  Hyperpigmented and light pink thin plaques with slight desquamation on the b/l extensor elbows  Diffuse mild xerosis  Scalp and face clear MILD